# Patient Record
Sex: MALE | Race: WHITE | NOT HISPANIC OR LATINO | Employment: FULL TIME | URBAN - METROPOLITAN AREA
[De-identification: names, ages, dates, MRNs, and addresses within clinical notes are randomized per-mention and may not be internally consistent; named-entity substitution may affect disease eponyms.]

---

## 2024-01-03 ENCOUNTER — HOSPITAL ENCOUNTER (EMERGENCY)
Facility: HOSPITAL | Age: 29
Discharge: HOME | End: 2024-01-03
Attending: EMERGENCY MEDICINE
Payer: COMMERCIAL

## 2024-01-03 VITALS
TEMPERATURE: 98.5 F | HEART RATE: 60 BPM | DIASTOLIC BLOOD PRESSURE: 87 MMHG | RESPIRATION RATE: 16 BRPM | OXYGEN SATURATION: 96 % | SYSTOLIC BLOOD PRESSURE: 138 MMHG

## 2024-01-03 DIAGNOSIS — J02.0 STREP PHARYNGITIS: Primary | ICD-10-CM

## 2024-01-03 PROCEDURE — 99284 EMERGENCY DEPT VISIT MOD MDM: CPT | Performed by: EMERGENCY MEDICINE

## 2024-01-03 PROCEDURE — 99282 EMERGENCY DEPT VISIT SF MDM: CPT

## 2024-01-03 RX ORDER — AMOXICILLIN 500 MG/1
500 TABLET, FILM COATED ORAL 2 TIMES DAILY
COMMUNITY

## 2024-01-03 RX ADMIN — DEXAMETHASONE SODIUM PHOSPHATE 10 MG: 10 INJECTION, SOLUTION INTRAMUSCULAR; INTRAVENOUS at 22:22

## 2024-01-04 NOTE — ED PROVIDER NOTES
History  Chief Complaint   Patient presents with    Sore Throat     Pt reports he was dx with strep throat. Has been on antibiotics for 4.5 days and it is getting worse. Increased swelling having harder time swallowing     Talha Vasques is a 28-year-old male without significant PMHx presenting for worsening sore throat, dysphagia, and dyspnea. Patient reports that he has had sore throat symptoms for the past 10 days and tested positive for strep 5 days ago. He has been taken amoxicillin since that time but has noticed persistent of his sore throat with worsening left sided swelling, pain with talking, and difficulty breathing while sleeping at night. He also notes that he has been spiking fevers, last fever was yesterday. He has been treating symptomatically with Tylenol without relief.       Sore Throat  Associated symptoms: shortness of breath and trouble swallowing    Associated symptoms: no chest pain, no chills, no drooling, no ear discharge, no ear pain and no voice change        Prior to Admission Medications   Prescriptions Last Dose Informant Patient Reported? Taking?   amoxicillin (AMOXIL) 500 MG tablet 1/3/2024  Yes Yes   Sig: Take 500 mg by mouth 2 (two) times a day      Facility-Administered Medications: None       No past medical history on file.    No past surgical history on file.    No family history on file.  I have reviewed and agree with the history as documented.    E-Cigarette/Vaping     E-Cigarette/Vaping Substances     Social History     Tobacco Use    Smoking status: Never    Smokeless tobacco: Never   Substance Use Topics    Drug use: Yes     Types: Marijuana        Review of Systems   Constitutional:  Positive for fatigue. Negative for chills.   HENT:  Positive for sore throat and trouble swallowing. Negative for drooling, ear discharge, ear pain, facial swelling, mouth sores and voice change.    Respiratory:  Positive for shortness of breath. Negative for choking.    Cardiovascular:   Negative for chest pain and palpitations.   Gastrointestinal:  Negative for constipation, diarrhea, nausea and vomiting.   Genitourinary: Negative.    Musculoskeletal: Negative.    Neurological: Negative.    Psychiatric/Behavioral: Negative.         Physical Exam  ED Triage Vitals [01/03/24 1826]   Temperature Pulse Respirations Blood Pressure SpO2   98.5 °F (36.9 °C) 73 18 134/82 98 %      Temp Source Heart Rate Source Patient Position - Orthostatic VS BP Location FiO2 (%)   Oral Monitor Sitting Right arm --      Pain Score       --             Orthostatic Vital Signs  Vitals:    01/03/24 1826 01/03/24 2130   BP: 134/82 138/87   Pulse: 73 60   Patient Position - Orthostatic VS: Sitting        Physical Exam  Vitals reviewed.   Constitutional:       General: He is not in acute distress.     Appearance: He is well-developed and normal weight.   HENT:      Head: Normocephalic and atraumatic.      Right Ear: Tympanic membrane normal. No drainage.      Left Ear: Tympanic membrane normal. No drainage.      Nose: No congestion or rhinorrhea.      Mouth/Throat:      Mouth: Mucous membranes are moist.      Pharynx: Oropharynx is clear. Uvula midline. No pharyngeal swelling, oropharyngeal exudate, posterior oropharyngeal erythema or uvula swelling.      Tonsils: No tonsillar exudate or tonsillar abscesses.   Eyes:      Conjunctiva/sclera: Conjunctivae normal.      Pupils: Pupils are equal, round, and reactive to light.   Cardiovascular:      Rate and Rhythm: Normal rate.      Heart sounds: Normal heart sounds.   Pulmonary:      Effort: Pulmonary effort is normal.      Breath sounds: Normal breath sounds.   Abdominal:      General: Bowel sounds are normal.      Palpations: Abdomen is soft.   Musculoskeletal:      Cervical back: Normal range of motion and neck supple.   Skin:     General: Skin is warm and dry.      Capillary Refill: Capillary refill takes less than 2 seconds.   Neurological:      General: No focal deficit  present.      Mental Status: He is alert.   Psychiatric:         Mood and Affect: Mood normal.         Behavior: Behavior normal.         ED Medications  Medications   dexamethasone oral liquid 10 mg 1 mL (10 mg Oral Given 1/3/24 2222)       Diagnostic Studies  Results Reviewed       None                   No orders to display         Procedures  Procedures      ED Course                             SBIRT 20yo+      Flowsheet Row Most Recent Value   Initial Alcohol Screen: US AUDIT-C     1. How often do you have a drink containing alcohol? 0 Filed at: 01/03/2024 2222   2. How many drinks containing alcohol do you have on a typical day you are drinking?  0 Filed at: 01/03/2024 2222   3a. Male UNDER 65: How often do you have five or more drinks on one occasion? 0 Filed at: 01/03/2024 2222   3b. FEMALE Any Age, or MALE 65+: How often do you have 4 or more drinks on one occassion? 0 Filed at: 01/03/2024 2222   Audit-C Score 0 Filed at: 01/03/2024 2222   MARILU: How many times in the past year have you...    Used an illegal drug or used a prescription medication for non-medical reasons? Never Filed at: 01/03/2024 2222                  Medical Decision Making  28-year-old male without significant Pmhx presenting for worsening sore throat, dysphagia, and pain with speaking. Patient has had symptoms for 10 days, tested positive for Strep 5 days ago, and has been taking amoxicillin.     Physical exam largely unremarkable. No obvious tonsillar abscess or swollen lymph nodes noted. Patient given dexamethasone oral solution for symptoms and stable for discharge home.           Disposition  Final diagnoses:   Strep pharyngitis     Time reflects when diagnosis was documented in both MDM as applicable and the Disposition within this note       Time User Action Codes Description Comment    1/3/2024 10:25 PM Kenneth Pritchett Add [J02.0] Strep pharyngitis           ED Disposition       ED Disposition   Discharge    Condition   Stable     Date/Time   Wed Nam 3, 2024 2225    Comment   Talha Vasques discharge to home/self care.                   Follow-up Information    None         Patient's Medications   Discharge Prescriptions    No medications on file     No discharge procedures on file.    PDMP Review       None             ED Provider  Attending physically available and evaluated Talha Vasques. I managed the patient along with the ED Attending.    Electronically Signed by           Kina Garcia DO  01/03/24 222

## 2024-01-04 NOTE — DISCHARGE INSTRUCTIONS
Diagnosis: strep throat -a bacterial throat infection      - please keep well hydrated     - please finish amoxicillin     - for throat pain -- can use both over the counter generic acetaminophen 500 mg- together with over the counter generic ibuprofen 400 mg 4 times a day with meals/ liquids    - the liquid medication that you received in the er -dexamethasone- helps with  throat pain -- one dose last for 3 days should not need anymore  of this medication     - please return to  the er for nay persistent fevers- temp > 100.4/ any increasing throat pain/ any drooling/ any increasing difficulty opening mouth or any redness/swelling of anterior neck

## 2024-01-07 NOTE — ED ATTENDING ATTESTATION
1/3/2024  I, Kenneth Pritchett MD, saw and evaluated the patient. I have discussed the patient with the resident/non-physician practitioner and agree with the resident's/non-physician practitioner's findings, Plan of Care, and MDM as documented in the resident's/non-physician practitioner's note, except where noted. All available labs and Radiology studies were reviewed.  I was present for key portions of any procedure(s) performed by the resident/non-physician practitioner and I was immediately available to provide assistance.       At this point I agree with the current assessment done in the Emergency Department.  I have conducted an independent evaluation of this patient a history and physical is as follows:see h and p above     ED Course         Critical Care Time  Procedures

## 2024-07-16 ENCOUNTER — TELEPHONE (OUTPATIENT)
Age: 29
End: 2024-07-16

## 2024-07-16 NOTE — TELEPHONE ENCOUNTER
Patient requesting immunization record be sent to     Talha Vasques   [address] [place] [id number]    Please call if needed. Thank you!